# Patient Record
Sex: MALE | Race: WHITE | NOT HISPANIC OR LATINO | Employment: OTHER | URBAN - METROPOLITAN AREA
[De-identification: names, ages, dates, MRNs, and addresses within clinical notes are randomized per-mention and may not be internally consistent; named-entity substitution may affect disease eponyms.]

---

## 2022-01-14 ENCOUNTER — OFFICE VISIT (OUTPATIENT)
Dept: URGENT CARE | Facility: CLINIC | Age: 62
End: 2022-01-14

## 2022-01-14 VITALS
DIASTOLIC BLOOD PRESSURE: 70 MMHG | BODY MASS INDEX: 24.36 KG/M2 | RESPIRATION RATE: 16 BRPM | HEIGHT: 76 IN | HEART RATE: 78 BPM | SYSTOLIC BLOOD PRESSURE: 129 MMHG | OXYGEN SATURATION: 98 % | TEMPERATURE: 98 F | WEIGHT: 200 LBS

## 2022-01-14 DIAGNOSIS — M25.512 ACUTE PAIN OF LEFT SHOULDER: Primary | ICD-10-CM

## 2022-01-14 PROCEDURE — 96372 PR INJECTION,THERAP/PROPH/DIAG2ST, IM OR SUBCUT: ICD-10-PCS | Mod: TIER,S$GLB,, | Performed by: FAMILY MEDICINE

## 2022-01-14 PROCEDURE — 73030 X-RAY EXAM OF SHOULDER: CPT | Mod: TIER,LT,S$GLB, | Performed by: RADIOLOGY

## 2022-01-14 PROCEDURE — 99204 OFFICE O/P NEW MOD 45 MIN: CPT | Mod: TIER,25,S$GLB, | Performed by: PHYSICIAN ASSISTANT

## 2022-01-14 PROCEDURE — 96372 THER/PROPH/DIAG INJ SC/IM: CPT | Mod: TIER,S$GLB,, | Performed by: FAMILY MEDICINE

## 2022-01-14 PROCEDURE — 73030 XR SHOULDER COMPLETE 2 OR MORE VIEWS LEFT: ICD-10-PCS | Mod: TIER,LT,S$GLB, | Performed by: RADIOLOGY

## 2022-01-14 PROCEDURE — 99204 PR OFFICE/OUTPT VISIT, NEW, LEVL IV, 45-59 MIN: ICD-10-PCS | Mod: TIER,25,S$GLB, | Performed by: PHYSICIAN ASSISTANT

## 2022-01-14 RX ORDER — NAPROXEN 500 MG/1
500 TABLET ORAL 2 TIMES DAILY
Qty: 20 TABLET | Refills: 0 | Status: SHIPPED | OUTPATIENT
Start: 2022-01-14 | End: 2023-03-01

## 2022-01-14 RX ORDER — KETOROLAC TROMETHAMINE 30 MG/ML
30 INJECTION, SOLUTION INTRAMUSCULAR; INTRAVENOUS
Status: COMPLETED | OUTPATIENT
Start: 2022-01-14 | End: 2022-01-14

## 2022-01-14 RX ORDER — DEXAMETHASONE SODIUM PHOSPHATE 100 MG/10ML
10 INJECTION INTRAMUSCULAR; INTRAVENOUS
Status: COMPLETED | OUTPATIENT
Start: 2022-01-14 | End: 2022-01-14

## 2022-01-14 RX ADMIN — DEXAMETHASONE SODIUM PHOSPHATE 10 MG: 100 INJECTION INTRAMUSCULAR; INTRAVENOUS at 08:01

## 2022-01-14 RX ADMIN — KETOROLAC TROMETHAMINE 30 MG: 30 INJECTION, SOLUTION INTRAMUSCULAR; INTRAVENOUS at 08:01

## 2022-01-14 NOTE — PROGRESS NOTES
"Subjective:       Patient ID: Ángel Patel is a 61 y.o. male.    Vitals:  height is 6' 4" (1.93 m) and weight is 90.7 kg (200 lb). His tympanic temperature is 98.2 °F (36.8 °C). His blood pressure is 129/70 and his pulse is 78. His respiration is 16 and oxygen saturation is 98%.     Chief Complaint: Shoulder Pain (Left/)    Patient reports left shoulder pain. Denies trauma or injury but does report hard work and cleaning up around his house recently. States that he was shoveling dirt prior to pain starting. Denies numbness or tingling. Reports pain x 4 days. Treating with tylenol and motrin with no relief of the pain. Reports unable to range left shoulder due to pain    Shoulder Pain   The pain is present in the left shoulder. This is a recurrent (Pt c/o left shoulder pain x4 days. Denies any trauma.  No range of motion. ) problem. The current episode started in the past 7 days. The problem occurs constantly. The problem has been gradually worsening. The quality of the pain is described as sharp. The pain is at a severity of 10/10. The pain is severe. Associated symptoms include an inability to bear weight and a limited range of motion. He has tried acetaminophen (tylenol. motrin ) for the symptoms. The treatment provided no relief.       Musculoskeletal: Positive for pain, joint pain and abnormal ROM of joint.       Objective:      Physical Exam   Constitutional: He is oriented to person, place, and time. He appears well-developed and well-nourished. He is cooperative.  Non-toxic appearance. He does not have a sickly appearance. He does not appear ill. No distress.   HENT:   Head: Normocephalic and atraumatic.   Ears:   Right Ear: Hearing normal.   Left Ear: Hearing normal.   Eyes: Conjunctivae and lids are normal. No scleral icterus.   Neck: Trachea normal and phonation normal. Neck supple. No crepitus. There are no signs of injury. No torticollis present. No edema present. No erythema present. No neck " rigidity present. No decreased range of motion present. No pain with movement present. No spinous process tenderness present. No muscular tenderness present.   Cardiovascular: Normal rate, regular rhythm and normal pulses.   Pulmonary/Chest: Effort normal. No respiratory distress.   Abdominal: Normal appearance.   Musculoskeletal:      Left shoulder: He exhibits decreased range of motion and tenderness. He exhibits no swelling, no effusion, no crepitus, no deformity, normal pulse and normal strength.        Arms:    Neurological: He is alert and oriented to person, place, and time. Coordination normal.   Skin: Skin is dry, intact, not diaphoretic and not pale.   Psychiatric: He has a normal mood and affect. His speech is normal and behavior is normal. Judgment and thought content normal. Cognition and memory  Nursing note and vitals reviewed.        Assessment:       1. Acute pain of left shoulder          Plan:         Acute pain of left shoulder  -     XR SHOULDER COMPLETE 2 OR MORE VIEWS LEFT; Future; Expected date: 01/14/2022  -     dexamethasone injection 10 mg  -     ketorolac injection 30 mg  -     naproxen (NAPROSYN) 500 MG tablet; Take 1 tablet (500 mg total) by mouth 2 (two) times daily.  Dispense: 20 tablet; Refill: 0    XR SHOULDER COMPLETE 2 OR MORE VIEWS LEFT    Result Date: 1/14/2022  EXAMINATION: XR SHOULDER COMPLETE 2 OR MORE VIEWS LEFT TECHNIQUE: Three views of the left shoulder were obtained, with AP projections in internal and external rotation and a lateral projections submitted. COMPARISON: No relevant prior examinations are available for comparison purposes.  Clinical information obtained from the electronic medical record indicates recent onset of left shoulder pain, with no trauma history. FINDINGS: Visualized osseous structures appear unremarkable, with no evidence of recent or healing fracture, lytic destructive process, or other significant abnormality identified.  No glenohumeral  "dislocation.  No abnormal soft tissue calcifications.  Tiny separate ossific density at the level of the left acromioclavicular joint is incidentally seen, felt to be of no clinical importance.     No significant abnormality. Electronically signed by: Freddy Malin MD Date:    01/14/2022 Time:    08:45  Imaging reviewed by me in PACS and with patient     Recommend rest and movement as tolerated to prevent frozen shoulder. Close Ortho f/u recommended. Discussed with patient the importance of f/u with their primary care provider. Urged to go to the ER for any worsening signs or symptoms.     Patient Instructions   You must understand that you have received treatment at an Urgent Care facility only, and that you may be  released before all of your medical problems are known or treated. Urgent Care facilities are not equipped to  handle life threatening emergencies. It is recommended that you seek care at an Emergency Department for  further evaluation of worsening or concerning symptoms, or possibly life threatening conditions as  discussed.  Patient Education       Shoulder Pain Discharge Instructions   About this topic   Your shoulder joint is made of 3 bones. These are the upper arm bone, the shoulder blade, and the collarbone. The shoulder is a "ball and socket" joint. The "ball" part of the joint is the top part of your upper arm bone. The "socket" part of your joint is a cup shaped indentation in your shoulder blade. Because of this, the shoulder can move in many ways. Strong bands of tissue called ligaments help hold the shoulder in place. Muscles and tendons also hold it in place.  You can have pain in your shoulder for many reasons. It may be hard for the doctor to tell exactly where the pain is coming from. You can have pain in your muscles, bones, or joints. It can also happen in your tendons and ligaments which connect these together.  Causes of this kind of pain may include:  · Overuse or using muscles in the " same way over and over  · Trauma from falls, accidents, direct blows to muscles, and injuries such as bone breaks, sprains, or dislocations  · Strain on your muscles from bad posture           What care is needed at home?   · Ask your doctor what you need to do when you go home. Make sure you ask questions if you do not understand what the doctor says. This way you will know what you need to do.  · Rest. Allow your injury to heal before you do slow movements.  · Place an ice pack or a bag of frozen peas wrapped in a towel over the painful part. Never put ice right on the skin. Do not leave the ice on more than 10 to 15 minutes at a time.  · Prop your arm on pillows to help with swelling.  · Your doctor may want you to use a sling, strap, or sleeve to keep your shoulder from moving.  · Heat may be used but not right after an injury. Heat can make swelling worse. If your doctor tells you to use heat, put a heating pad on your shoulder for no more than 20 minutes at a time. Never go to sleep with a heating pad on as this can cause burns.  · Do range of motion exercises as your therapist or doctor teaches you to do. As your shoulder heals, you will be given more exercises to stretch and strengthen your shoulder.  What follow-up care is needed?   · Your doctor may ask you to make visits to the office to check on your progress. Be sure to keep all these visits.  · Your doctor may send you to physical therapy or occupational therapy to help you regain use of your shoulder sooner.  What drugs may be needed?   The doctor may order drugs to:  · Help with pain and swelling  The doctor may give you a shot of an anti-inflammatory drug called a corticosteroid. This will help with swelling. Talk with your doctor about the risks of this shot.  Will physical activity be limited?   Your doctor may ask you to rest and limit your activity. Based on how bad your shoulder injury is, this could last for a few days to a number of  weeks.  What can be done to prevent this health problem?   · Stay active and work out to keep your muscles strong and flexible.  · Warm up slowly and stretch your muscles before you work out. Do not work out if you are overly tired. Take extra care if working out in cold weather.  · Slowly increase the amount of time you work out. If you are using weights, slowly increase the weight to strengthen your muscles.  · Wear protection when playing sports.  · Take breaks often when doing things that use repeat movements.  When do I need to call the doctor?   · Pain or swelling gets worse  · Hand feels cold or numb  · You are not feeling better in 2 or 3 days or you are feeling worse  Teach Back: Helping You Understand   The Teach Back Method helps you understand the information we are giving you. After you talk with the staff, tell them in your own words what you learned. This helps to make sure the staff has described each thing clearly. It also helps to explain things that may have been confusing. Before going home, make sure you can do these:  · I can tell you about my condition.  · I can tell you what may help ease my pain.  · I can tell you what I will do if I have more pain or swelling or my fingers are cool or blue.  Where can I learn more?   [employer]  http://orthoinfo.aaos.org/PDFs/Q30232.pdf   Last Reviewed Date   2020-09-25  Consumer Information Use and Disclaimer   This information is not specific medical advice and does not replace information you receive from your health care provider. This is only a brief summary of general information. It does NOT include all information about conditions, illnesses, injuries, tests, procedures, treatments, therapies, discharge instructions or life-style choices that may apply to you. You must talk with your health care provider for complete information about your health and treatment options. This information should not be used to decide  whether or not to accept your health care providers advice, instructions or recommendations. Only your health care provider has the knowledge and training to provide advice that is right for you.  Copyright   Copyright © 2021 [employer] and its affiliates and/or licensors. All rights reserved.

## 2022-01-14 NOTE — PATIENT INSTRUCTIONS
"You must understand that you have received treatment at an Urgent Care facility only, and that you may be  released before all of your medical problems are known or treated. Urgent Care facilities are not equipped to  handle life threatening emergencies. It is recommended that you seek care at an Emergency Department for  further evaluation of worsening or concerning symptoms, or possibly life threatening conditions as  discussed.  Patient Education       Shoulder Pain Discharge Instructions   About this topic   Your shoulder joint is made of 3 bones. These are the upper arm bone, the shoulder blade, and the collarbone. The shoulder is a "ball and socket" joint. The "ball" part of the joint is the top part of your upper arm bone. The "socket" part of your joint is a cup shaped indentation in your shoulder blade. Because of this, the shoulder can move in many ways. Strong bands of tissue called ligaments help hold the shoulder in place. Muscles and tendons also hold it in place.  You can have pain in your shoulder for many reasons. It may be hard for the doctor to tell exactly where the pain is coming from. You can have pain in your muscles, bones, or joints. It can also happen in your tendons and ligaments which connect these together.  Causes of this kind of pain may include:  · Overuse or using muscles in the same way over and over  · Trauma from falls, accidents, direct blows to muscles, and injuries such as bone breaks, sprains, or dislocations  · Strain on your muscles from bad posture           What care is needed at home?   · Ask your doctor what you need to do when you go home. Make sure you ask questions if you do not understand what the doctor says. This way you will know what you need to do.  · Rest. Allow your injury to heal before you do slow movements.  · Place an ice pack or a bag of frozen peas wrapped in a towel over the painful part. Never put ice right on the skin. Do not leave the ice on more " than 10 to 15 minutes at a time.  · Prop your arm on pillows to help with swelling.  · Your doctor may want you to use a sling, strap, or sleeve to keep your shoulder from moving.  · Heat may be used but not right after an injury. Heat can make swelling worse. If your doctor tells you to use heat, put a heating pad on your shoulder for no more than 20 minutes at a time. Never go to sleep with a heating pad on as this can cause burns.  · Do range of motion exercises as your therapist or doctor teaches you to do. As your shoulder heals, you will be given more exercises to stretch and strengthen your shoulder.  What follow-up care is needed?   · Your doctor may ask you to make visits to the office to check on your progress. Be sure to keep all these visits.  · Your doctor may send you to physical therapy or occupational therapy to help you regain use of your shoulder sooner.  What drugs may be needed?   The doctor may order drugs to:  · Help with pain and swelling  The doctor may give you a shot of an anti-inflammatory drug called a corticosteroid. This will help with swelling. Talk with your doctor about the risks of this shot.  Will physical activity be limited?   Your doctor may ask you to rest and limit your activity. Based on how bad your shoulder injury is, this could last for a few days to a number of weeks.  What can be done to prevent this health problem?   · Stay active and work out to keep your muscles strong and flexible.  · Warm up slowly and stretch your muscles before you work out. Do not work out if you are overly tired. Take extra care if working out in cold weather.  · Slowly increase the amount of time you work out. If you are using weights, slowly increase the weight to strengthen your muscles.  · Wear protection when playing sports.  · Take breaks often when doing things that use repeat movements.  When do I need to call the doctor?   · Pain or swelling gets worse  · Hand feels cold or numb  · You  are not feeling better in 2 or 3 days or you are feeling worse  Teach Back: Helping You Understand   The Teach Back Method helps you understand the information we are giving you. After you talk with the staff, tell them in your own words what you learned. This helps to make sure the staff has described each thing clearly. It also helps to explain things that may have been confusing. Before going home, make sure you can do these:  · I can tell you about my condition.  · I can tell you what may help ease my pain.  · I can tell you what I will do if I have more pain or swelling or my fingers are cool or blue.  Where can I learn more?   [employer]  http://orthoinfo.aaos.org/PDFs/L12520.pdf   Last Reviewed Date   2020-09-25  Consumer Information Use and Disclaimer   This information is not specific medical advice and does not replace information you receive from your health care provider. This is only a brief summary of general information. It does NOT include all information about conditions, illnesses, injuries, tests, procedures, treatments, therapies, discharge instructions or life-style choices that may apply to you. You must talk with your health care provider for complete information about your health and treatment options. This information should not be used to decide whether or not to accept your health care providers advice, instructions or recommendations. Only your health care provider has the knowledge and training to provide advice that is right for you.  Copyright   Copyright © 2021 [employer] and its affiliates and/or licensors. All rights reserved.

## 2023-06-05 PROBLEM — M17.12 PRIMARY OSTEOARTHRITIS OF LEFT KNEE: Status: ACTIVE | Noted: 2023-06-05
